# Patient Record
Sex: FEMALE | URBAN - METROPOLITAN AREA
[De-identification: names, ages, dates, MRNs, and addresses within clinical notes are randomized per-mention and may not be internally consistent; named-entity substitution may affect disease eponyms.]

---

## 2024-07-30 ENCOUNTER — APPOINTMENT (RX ONLY)
Dept: URBAN - METROPOLITAN AREA CLINIC 42 | Facility: CLINIC | Age: 49
Setting detail: DERMATOLOGY
End: 2024-07-30

## 2024-07-30 DIAGNOSIS — D18.0 HEMANGIOMA: ICD-10-CM

## 2024-07-30 DIAGNOSIS — L81.4 OTHER MELANIN HYPERPIGMENTATION: ICD-10-CM

## 2024-07-30 DIAGNOSIS — L82.1 OTHER SEBORRHEIC KERATOSIS: ICD-10-CM

## 2024-07-30 DIAGNOSIS — D22 MELANOCYTIC NEVI: ICD-10-CM

## 2024-07-30 DIAGNOSIS — L57.8 OTHER SKIN CHANGES DUE TO CHRONIC EXPOSURE TO NONIONIZING RADIATION: ICD-10-CM

## 2024-07-30 DIAGNOSIS — Z41.9 ENCOUNTER FOR PROCEDURE FOR PURPOSES OTHER THAN REMEDYING HEALTH STATE, UNSPECIFIED: ICD-10-CM

## 2024-07-30 PROBLEM — D18.01 HEMANGIOMA OF SKIN AND SUBCUTANEOUS TISSUE: Status: ACTIVE | Noted: 2024-07-30

## 2024-07-30 PROBLEM — D22.5 MELANOCYTIC NEVI OF TRUNK: Status: ACTIVE | Noted: 2024-07-30

## 2024-07-30 PROCEDURE — ? BOTOX

## 2024-07-30 PROCEDURE — ? COUNSELING

## 2024-07-30 PROCEDURE — 99203 OFFICE O/P NEW LOW 30 MIN: CPT

## 2024-07-30 ASSESSMENT — LOCATION ZONE DERM
LOCATION ZONE: FACE
LOCATION ZONE: LEG
LOCATION ZONE: ARM
LOCATION ZONE: TRUNK

## 2024-07-30 ASSESSMENT — LOCATION DETAILED DESCRIPTION DERM
LOCATION DETAILED: LEFT INFERIOR MEDIAL UPPER BACK
LOCATION DETAILED: LEFT ANTERIOR DISTAL THIGH
LOCATION DETAILED: RIGHT MID-UPPER BACK
LOCATION DETAILED: GLABELLA
LOCATION DETAILED: SUPERIOR THORACIC SPINE
LOCATION DETAILED: LEFT ANTERIOR PROXIMAL UPPER ARM
LOCATION DETAILED: LEFT CENTRAL MALAR CHEEK
LOCATION DETAILED: EPIGASTRIC SKIN
LOCATION DETAILED: PERIUMBILICAL SKIN
LOCATION DETAILED: RIGHT ANTERIOR PROXIMAL UPPER ARM
LOCATION DETAILED: RIGHT ANTERIOR DISTAL THIGH
LOCATION DETAILED: RIGHT SUPERIOR MEDIAL UPPER BACK
LOCATION DETAILED: RIGHT MEDIAL EYEBROW
LOCATION DETAILED: RIGHT MEDIAL SUPERIOR CHEST
LOCATION DETAILED: UPPER STERNUM
LOCATION DETAILED: LEFT INFERIOR CENTRAL MALAR CHEEK

## 2024-07-30 ASSESSMENT — LOCATION SIMPLE DESCRIPTION DERM
LOCATION SIMPLE: ABDOMEN
LOCATION SIMPLE: CHEST
LOCATION SIMPLE: LEFT UPPER BACK
LOCATION SIMPLE: LEFT THIGH
LOCATION SIMPLE: RIGHT EYEBROW
LOCATION SIMPLE: RIGHT UPPER ARM
LOCATION SIMPLE: LEFT UPPER ARM
LOCATION SIMPLE: GLABELLA
LOCATION SIMPLE: UPPER BACK
LOCATION SIMPLE: RIGHT THIGH
LOCATION SIMPLE: RIGHT UPPER BACK
LOCATION SIMPLE: LEFT CHEEK

## 2024-07-30 NOTE — PROCEDURE: BOTOX
Total Units: 10
Detail Level: Detailed
Price (Use Numbers Only, No Special Characters Or $): 160
Map Statement: Please see attached map for locations and injection amounts.
Consent: Written consent obtained. Risks include but not limited to lid/brow ptosis, bruising, swelling, diplopia, temporary effect, incomplete chemical denervation.
Post-Care Instructions: Patient instructed to not lie down for 4 hours and limit physical activity for 24 hours.